# Patient Record
Sex: FEMALE | Race: BLACK OR AFRICAN AMERICAN | Employment: FULL TIME | URBAN - METROPOLITAN AREA
[De-identification: names, ages, dates, MRNs, and addresses within clinical notes are randomized per-mention and may not be internally consistent; named-entity substitution may affect disease eponyms.]

---

## 2023-01-04 ENCOUNTER — OFFICE VISIT (OUTPATIENT)
Dept: URGENT CARE | Age: 53
End: 2023-01-04
Payer: MEDICAID

## 2023-01-04 VITALS
SYSTOLIC BLOOD PRESSURE: 124 MMHG | WEIGHT: 246 LBS | TEMPERATURE: 97.2 F | RESPIRATION RATE: 15 BRPM | BODY MASS INDEX: 39.71 KG/M2 | DIASTOLIC BLOOD PRESSURE: 80 MMHG | HEART RATE: 60 BPM | OXYGEN SATURATION: 99 %

## 2023-01-04 DIAGNOSIS — Z20.822 ENCOUNTER FOR LABORATORY TESTING FOR COVID-19 VIRUS: Primary | ICD-10-CM

## 2023-01-04 DIAGNOSIS — R42 VERTIGO: ICD-10-CM

## 2023-01-04 LAB — SARS-COV-2 PCR, POC: NEGATIVE

## 2023-01-04 PROCEDURE — 87635 SARS-COV-2 COVID-19 AMP PRB: CPT | Performed by: FAMILY MEDICINE

## 2023-01-04 PROCEDURE — 99202 OFFICE O/P NEW SF 15 MIN: CPT | Performed by: FAMILY MEDICINE

## 2023-01-04 RX ORDER — MECLIZINE HYDROCHLORIDE 25 MG/1
25 TABLET ORAL
Qty: 20 TABLET | Refills: 0 | Status: SHIPPED | OUTPATIENT
Start: 2023-01-04 | End: 2023-01-14

## 2023-01-04 NOTE — LETTER
[facility] AMBULATORY SERVICES  [address]  [address]  [facility] [address]    Work/School Note    Date: 1/4/2023    To Whom It May concern:    Surinder Ann was seen and treated today in the urgent care center by the following provider(s):  No providers found. Surinder Ann may return to work on 1/5/22.     Sincerely,          Natali Ramsey MD

## 2023-01-04 NOTE — PROGRESS NOTES
Dizziness  This is a new problem. Episode onset: today when wake up. The problem has not changed since onset. Pertinent negatives include no headaches and no shortness of breath. Associated symptoms comments: Feels spinning  in room, with position change   Mo ear pain/tinituis/ loss of balance  Has nausea- but its recurrent for other reason ( seing GI )  No other cold/ flu sxs. The symptoms are aggravated by bending and walking (position change- getting up mainly). The symptoms are relieved by rest and laying down. She has tried nothing for the symptoms. Past Medical History:   Diagnosis Date    Arrhythmia     tachycardia    Other ill-defined conditions(799.89)     anemia    Other ill-defined conditions(799.89)     sciatica        Past Surgical History:   Procedure Laterality Date    HX HYSTERECTOMY      herpes         History reviewed. No pertinent family history. Social History     Socioeconomic History    Marital status: SINGLE     Spouse name: Not on file    Number of children: Not on file    Years of education: Not on file    Highest education level: Not on file   Occupational History    Not on file   Tobacco Use    Smoking status: Former    Smokeless tobacco: Not on file   Substance and Sexual Activity    Alcohol use: No    Drug use: Not on file    Sexual activity: Not on file   Other Topics Concern    Not on file   Social History Narrative    Not on file     Social Determinants of Health     Financial Resource Strain: Not on file   Food Insecurity: Not on file   Transportation Needs: Not on file   Physical Activity: Not on file   Stress: Not on file   Social Connections: Not on file   Intimate Partner Violence: Not on file   Housing Stability: Not on file                ALLERGIES: Wellbutrin [bupropion hcl]    Review of Systems   Constitutional:  Negative for chills and fever. HENT:  Negative for ear discharge and ear pain. Respiratory:  Negative for shortness of breath.     Neurological: Positive for dizziness. Negative for headaches. Vertigo   All other systems reviewed and are negative. Vitals:    01/04/23 1035   BP: 124/80   Pulse: 60   Resp: 15   Temp: 97.2 °F (36.2 °C)   SpO2: 99%   Weight: 246 lb (111.6 kg)       Physical Exam  Vitals and nursing note reviewed. Constitutional:       General: She is not in acute distress. HENT:      Right Ear: Tympanic membrane and ear canal normal. There is no impacted cerumen. Left Ear: Tympanic membrane and ear canal normal. There is no impacted cerumen. Nose: Nose normal.      Mouth/Throat:      Pharynx: No oropharyngeal exudate or posterior oropharyngeal erythema. Eyes:      General:         Right eye: No discharge. Left eye: No discharge. Conjunctiva/sclera: Conjunctivae normal.   Cardiovascular:      Pulses: Normal pulses. Pulmonary:      Effort: Pulmonary effort is normal. No respiratory distress. Breath sounds: Normal breath sounds. No wheezing, rhonchi or rales. Musculoskeletal:      Cervical back: Neck supple. Lymphadenopathy:      Cervical: No cervical adenopathy. Skin:     Findings: No rash. Neurological:      General: No focal deficit present. Mental Status: She is oriented to person, place, and time. Cranial Nerves: No cranial nerve deficit. MDM    Procedures        ICD-10-CM ICD-9-CM    1. Encounter for laboratory testing for COVID-19 virus  Z20.822 V01.79 POCT COVID-19, SARS-COV-2, PCR      2. Vertigo  R42 780.4 meclizine (ANTIVERT) 25 mg tablet        Medications Ordered Today   Medications    meclizine (ANTIVERT) 25 mg tablet     Sig: Take 1 Tablet by mouth four (4) times daily as needed for Dizziness for up to 10 days.      Dispense:  20 Tablet     Refill:  0     Results for orders placed or performed in visit on 01/04/23   POCT COVID-19, SARS-COV-2, PCR   Result Value Ref Range    SARS-COV-2 PCR, POC Negative Negative     The patients condition was discussed with the patient and they understand. The patient is to follow up with primary care doctor. If signs and symptoms become worse the pt is to go to the ER. The patient is to take medications as prescribed.

## 2023-03-20 ENCOUNTER — OFFICE VISIT (OUTPATIENT)
Dept: URGENT CARE | Age: 53
End: 2023-03-20
Payer: MEDICAID

## 2023-03-20 VITALS
TEMPERATURE: 98.4 F | DIASTOLIC BLOOD PRESSURE: 98 MMHG | HEART RATE: 58 BPM | RESPIRATION RATE: 18 BRPM | SYSTOLIC BLOOD PRESSURE: 160 MMHG | OXYGEN SATURATION: 98 %

## 2023-03-20 DIAGNOSIS — R03.0 ELEVATED BLOOD PRESSURE READING: ICD-10-CM

## 2023-03-20 DIAGNOSIS — R00.2 HEART PALPITATIONS: ICD-10-CM

## 2023-03-20 DIAGNOSIS — T75.3XXA MOTION SICKNESS, INITIAL ENCOUNTER: ICD-10-CM

## 2023-03-20 DIAGNOSIS — N30.00 ACUTE CYSTITIS WITHOUT HEMATURIA: Primary | ICD-10-CM

## 2023-03-20 LAB
BILIRUB UR QL STRIP: NEGATIVE
GLUCOSE UR-MCNC: ABNORMAL MG/DL
KETONES P FAST UR STRIP-MCNC: NEGATIVE MG/DL
PH UR STRIP: 5 [PH] (ref 4.6–8)
PROT UR QL STRIP: NEGATIVE
SP GR UR STRIP: 1 (ref 1–1.03)
UA UROBILINOGEN AMB POC: ABNORMAL (ref 0.2–1)
URINALYSIS CLARITY POC: ABNORMAL
URINALYSIS COLOR POC: ABNORMAL
URINE BLOOD POC: NEGATIVE
URINE LEUKOCYTES POC: NEGATIVE
URINE NITRITES POC: POSITIVE

## 2023-03-20 PROCEDURE — 99214 OFFICE O/P EST MOD 30 MIN: CPT | Performed by: NURSE PRACTITIONER

## 2023-03-20 PROCEDURE — 81003 URINALYSIS AUTO W/O SCOPE: CPT | Performed by: NURSE PRACTITIONER

## 2023-03-20 PROCEDURE — 93000 ELECTROCARDIOGRAM COMPLETE: CPT | Performed by: NURSE PRACTITIONER

## 2023-03-20 RX ORDER — ATENOLOL 25 MG/1
25 TABLET ORAL
COMMUNITY

## 2023-03-20 RX ORDER — CEPHALEXIN 500 MG/1
500 CAPSULE ORAL 2 TIMES DAILY
Qty: 14 CAPSULE | Refills: 0 | Status: SHIPPED | OUTPATIENT
Start: 2023-03-20 | End: 2023-03-27

## 2023-03-20 RX ORDER — SCOLOPAMINE TRANSDERMAL SYSTEM 1 MG/1
1 PATCH, EXTENDED RELEASE TRANSDERMAL
Qty: 2 PATCH | Refills: 0 | Status: SHIPPED | OUTPATIENT
Start: 2023-03-20

## 2023-03-20 RX ORDER — FUROSEMIDE 40 MG/1
TABLET ORAL DAILY
COMMUNITY

## 2023-03-20 RX ORDER — SEMAGLUTIDE 1.34 MG/ML
0.5 INJECTION, SOLUTION SUBCUTANEOUS
COMMUNITY

## 2023-03-20 NOTE — PROGRESS NOTES
HPI   Pt presents with complaints of heart palpitations, intermittent for the last week. Going on a cruise and very nervous about it. Typically gets the palpitations when thinking about the cruise, packing, etc.  Hx heart palpitations and takes atenolol 25 daily prn per her PCP. Taking this without relief. Has never seen Cardiology. Would like something for motion sickness for the cruise. Also having back pain and dysuria. Taking Azo. On ozempic for 2 wks at 0.5mg dose for wt loss. Past Medical History:   Diagnosis Date    Arrhythmia     tachycardia    Other ill-defined conditions(799.89)     anemia    Other ill-defined conditions(799.89)     sciatica        Past Surgical History:   Procedure Laterality Date    HX HYSTERECTOMY      herpes         History reviewed. No pertinent family history. Social History     Socioeconomic History    Marital status: SINGLE     Spouse name: Not on file    Number of children: Not on file    Years of education: Not on file    Highest education level: Not on file   Occupational History    Not on file   Tobacco Use    Smoking status: Former    Smokeless tobacco: Not on file   Substance and Sexual Activity    Alcohol use: No    Drug use: Not on file    Sexual activity: Not on file   Other Topics Concern    Not on file   Social History Narrative    Not on file     Social Determinants of Health     Financial Resource Strain: Not on file   Food Insecurity: Not on file   Transportation Needs: Not on file   Physical Activity: Not on file   Stress: Not on file   Social Connections: Not on file   Intimate Partner Violence: Not on file   Housing Stability: Not on file                ALLERGIES: Wellbutrin [bupropion hcl]    Review of Systems   Constitutional:  Negative for chills and fever. Respiratory:  Negative for cough, shortness of breath and wheezing. Cardiovascular:  Positive for palpitations and leg swelling. Negative for chest pain.    Gastrointestinal: Negative for abdominal pain, nausea and vomiting. Genitourinary:  Positive for dysuria and frequency. Musculoskeletal:  Positive for back pain. Vitals:    03/20/23 1621 03/20/23 1622   BP: (!) 166/99 (!) 160/98   Pulse: (!) 58    Resp: 18    Temp: 98.4 °F (36.9 °C)    SpO2: 98%        Physical Exam  Constitutional:       General: She is not in acute distress. Appearance: She is well-developed. She is obese. She is not ill-appearing or toxic-appearing. HENT:      Head: Normocephalic and atraumatic. Mouth/Throat:      Mouth: Mucous membranes are moist.      Pharynx: Oropharynx is clear. Eyes:      Extraocular Movements: Extraocular movements intact. Conjunctiva/sclera: Conjunctivae normal.      Pupils: Pupils are equal, round, and reactive to light. Cardiovascular:      Rate and Rhythm: Normal rate and regular rhythm. Heart sounds: Normal heart sounds. Comments: Trace edema ankles and feet  Pulmonary:      Effort: Pulmonary effort is normal.      Breath sounds: Normal breath sounds. Abdominal:      General: Abdomen is flat. Bowel sounds are normal.      Palpations: Abdomen is soft. Tenderness: There is no abdominal tenderness. There is right CVA tenderness and left CVA tenderness (?). There is no guarding (?) or rebound. Musculoskeletal:      Cervical back: Normal range of motion. Skin:     General: Skin is warm and dry. Neurological:      General: No focal deficit present. Mental Status: She is alert and oriented to person, place, and time.      Results for orders placed or performed in visit on 03/20/23   AMB POC URINALYSIS DIP STICK MANUAL W/ MICRO   Result Value Ref Range    Color (UA POC) Orange     Clarity (UA POC) Cloudy     Glucose (UA POC) Trace Negative    Bilirubin (UA POC) Negative Negative    Ketones (UA POC) Negative Negative    Specific gravity (UA POC) 1.001 1.001 - 1.035    Blood (UA POC) Negative Negative    pH (UA POC) 5.0 4.6 - 8.0    Protein (UA POC) Negative Negative    Urobilinogen (UA POC) 0.2 mg/dL 0.2 - 1    Nitrites (UA POC) Positive Negative    Leukocyte esterase (UA POC) Negative Negative     -on Azo      ICD-10-CM ICD-9-CM   1. Acute cystitis without hematuria  N30.00 595.0   2. Heart palpitations  R00.2 785.1   3. Motion sickness, initial encounter  T75. 3XXA 994.6   4. Elevated blood pressure reading  R03.0 796.2   -recheck BP at home, see PCP if remains high. Take lasix daily as prescribed, pt not take consistenly    Orders Placed This Encounter    CULTURE, URINE     Standing Status:   Future     Number of Occurrences:   1     Standing Expiration Date:   3/20/2024    [facility] General Cardiology [facility]     Referral Priority:   Routine     Referral Type:   Consultation     Referral Reason:   Specialty Services Required     Referred to Provider:   Joya Diana MD     Number of Visits Requested:   1    AMB POC URINALYSIS DIP STICK MANUAL W/ MICRO    EKG, 12 LEAD, INITIAL     Standing Status:   Future     Standing Expiration Date:   2023     Order Specific Question:   Reason for Exam:     Answer: Tachy    scopolamine (TRANSDERM-SCOP) 1 mg over 3 days pt3d     Si Patch by TransDERmal route every seventy-two (72) hours. Indications: prevention of motion sickness     Dispense:  2 Patch     Refill:  0    cephALEXin (KEFLEX) 500 mg capsule     Sig: Take 1 Capsule by mouth two (2) times a day for 7 days. Dispense:  14 Capsule     Refill:  0      Pt appears quite nervous about upcoming cruise, suspect palpitations are related to this anxiety. If persistent symptoms, see cardiology. The patient is to follow up with PCP/Cardiology [facility]. If signs and symptoms become worse the pt is to go to the ER. Zenia Gilford, NP      MDM    EKG      Date/Time: 3/20/2023 5:52 PM  Performed by: Luis Fernando Humphries NP  Authorized by:  Luis Fernando Humphries NP   Previous ECG: no previous ECG available  Rhythm: sinus bradycardia  Rate: bradycardic  BPM: 53  QRS axis: normal  Conduction: conduction normal  ST Segments: ST segments normal  Clinical impression: non-specific ECG

## 2023-03-23 LAB
BACTERIA UR CULT: NORMAL
SPECIMEN STATUS REPORT, ROLRST: NORMAL

## 2023-11-09 ENCOUNTER — OFFICE VISIT (OUTPATIENT)
Age: 53
End: 2023-11-09
Payer: COMMERCIAL

## 2023-11-09 VITALS
OXYGEN SATURATION: 98 % | RESPIRATION RATE: 18 BRPM | SYSTOLIC BLOOD PRESSURE: 124 MMHG | HEART RATE: 62 BPM | WEIGHT: 202.6 LBS | DIASTOLIC BLOOD PRESSURE: 87 MMHG | TEMPERATURE: 97.8 F

## 2023-11-09 DIAGNOSIS — G89.29 CHRONIC NECK PAIN: ICD-10-CM

## 2023-11-09 DIAGNOSIS — J01.00 ACUTE MAXILLARY SINUSITIS, RECURRENCE NOT SPECIFIED: ICD-10-CM

## 2023-11-09 DIAGNOSIS — K21.9 GASTROESOPHAGEAL REFLUX DISEASE, UNSPECIFIED WHETHER ESOPHAGITIS PRESENT: ICD-10-CM

## 2023-11-09 DIAGNOSIS — R10.11 RUQ PAIN: Primary | ICD-10-CM

## 2023-11-09 DIAGNOSIS — R09.81 NASAL CONGESTION: ICD-10-CM

## 2023-11-09 DIAGNOSIS — M54.2 CHRONIC NECK PAIN: ICD-10-CM

## 2023-11-09 LAB
EXP DATE SOLUTION: NORMAL
EXP DATE SWAB: NORMAL
EXPIRATION DATE: NORMAL
LOT NUMBER POC: NORMAL
LOT NUMBER SOLUTION: NORMAL
LOT NUMBER SWAB: NORMAL
SARS-COV-2 RNA, POC: NEGATIVE

## 2023-11-09 PROCEDURE — 99214 OFFICE O/P EST MOD 30 MIN: CPT | Performed by: FAMILY MEDICINE

## 2023-11-09 PROCEDURE — 87635 SARS-COV-2 COVID-19 AMP PRB: CPT | Performed by: FAMILY MEDICINE

## 2023-11-09 RX ORDER — AMOXICILLIN AND CLAVULANATE POTASSIUM 875; 125 MG/1; MG/1
1 TABLET, FILM COATED ORAL 2 TIMES DAILY
Qty: 14 TABLET | Refills: 0 | Status: SHIPPED | OUTPATIENT
Start: 2023-11-09 | End: 2023-11-16

## 2023-11-09 RX ORDER — OMEPRAZOLE 20 MG/1
20 CAPSULE, DELAYED RELEASE ORAL
Qty: 14 CAPSULE | Refills: 0 | Status: SHIPPED | OUTPATIENT
Start: 2023-11-09 | End: 2023-11-23

## 2023-11-10 ENCOUNTER — TELEPHONE (OUTPATIENT)
Age: 53
End: 2023-11-10

## 2023-11-10 LAB
ALBUMIN SERPL-MCNC: 4.3 G/DL (ref 3.5–5)
ALBUMIN/GLOB SERPL: 0.9 (ref 1.1–2.2)
ALP SERPL-CCNC: 123 U/L (ref 45–117)
ALT SERPL-CCNC: 24 U/L (ref 12–78)
ANION GAP SERPL CALC-SCNC: 10 MMOL/L (ref 5–15)
AST SERPL-CCNC: 21 U/L (ref 15–37)
BILIRUB SERPL-MCNC: 0.5 MG/DL (ref 0.2–1)
BUN SERPL-MCNC: 16 MG/DL (ref 6–20)
BUN/CREAT SERPL: 17 (ref 12–20)
CALCIUM SERPL-MCNC: 9.2 MG/DL (ref 8.5–10.1)
CHLORIDE SERPL-SCNC: 103 MMOL/L (ref 97–108)
CO2 SERPL-SCNC: 25 MMOL/L (ref 21–32)
CREAT SERPL-MCNC: 0.94 MG/DL (ref 0.55–1.02)
GLOBULIN SER CALC-MCNC: 4.6 G/DL (ref 2–4)
GLUCOSE SERPL-MCNC: 74 MG/DL (ref 65–100)
LIPASE SERPL-CCNC: 56 U/L (ref 13–75)
POTASSIUM SERPL-SCNC: 4.5 MMOL/L (ref 3.5–5.1)
PROT SERPL-MCNC: 8.9 G/DL (ref 6.4–8.2)
SODIUM SERPL-SCNC: 138 MMOL/L (ref 136–145)

## 2023-11-10 NOTE — TELEPHONE ENCOUNTER
Per [facility] lab there was not enough sample to run CBC. Will place new order for draw and advise patient to return to clinic for recollection.

## 2023-11-10 NOTE — TELEPHONE ENCOUNTER
Writer called patient, verified patient identity using name and date of birth. Pt informed of needing a redraw on her CBC lab. Pt stated she could not come in tonight but will be in tomorrow (11/11/23). Pt given hours of 8am-2pm for 11/11/23. Pt stated thank you. Patient given an opportunity to ask questions, repeated information, and verbalized understanding.

## 2023-11-28 NOTE — RESULT ENCOUNTER NOTE
Attempted to reach patient but still unable to reach and VM is full. Will need to inform that her labs are ok except very mildly elevated alk phos, recommend rechecking with pcp in 2-4 weeks.